# Patient Record
(demographics unavailable — no encounter records)

---

## 2025-06-11 NOTE — PHYSICAL EXAM
[Soft] : soft [Non-tender] : non-tender [Non-distended] : non-distended [Examination Of The Breasts] : a normal appearance [Breast Palpation Diffuse Fibrous Tissue Bilateral] : fibrocystic changes [No Masses] : no breast masses were palpable [Labia Majora] : normal [Labia Minora] : normal [Normal] : normal